# Patient Record
Sex: MALE | Race: ASIAN
[De-identification: names, ages, dates, MRNs, and addresses within clinical notes are randomized per-mention and may not be internally consistent; named-entity substitution may affect disease eponyms.]

---

## 2017-04-16 NOTE — ERD
ER Documentation


Chief Complaint


Date/Time


DATE: 4/16/17 


TIME: 23:37


Chief Complaint


right shoulder pain believes it's gout





HPI


67-year-old male presents to emergency department for complaints of right 

shoulder pain started today. Patient drank some alcohol 4 hours prior to the 

pain, started to have the pain afterwards. Patient denies any trauma on 

affected area. Patient has history of gout, and is thinking that it may be gout 

flareup. Patient did not take any medications for pain. Patient denies any 

numbness or tingling. Patient denies any deformity. Patient has had the same 

type of pain before and it has been his gout flareup. Patient denies any 

redness or swelling. Patient denies any deformity.





ROS


All systems reviewed and are negative except as per history of present illness.





Medications


Home Meds


Active Scripts


Hydrocodone/Acetaminophen (Norco 5-325 Tablet) 1 Each Tablet, 1 TAB PO Q6H Y 

for SEVERE PAIN LEVEL 7-10, #20 TAB


   Prov:KARIN MEHTA NP         4/17/17


Ibuprofen* (Motrin*) 600 Mg Tab, 600 MG PO Q6H Y for PAIN AND OR ELEVATED TEMP, 

#30 TAB


   Prov:KARIN MEHTA NP         4/17/17


Reported Medications


[cardiac meds] Unknown Strength  No Conflict Check


   4/16/17


[htn meds] Unknown Strength  No Conflict Check


   4/16/17


[dm meds] Unknown Strength  No Conflict Check


   4/16/17





Allergies


Allergies:  


Coded Allergies:  


     No Known Allergy (Unverified , 4/16/17)





PMhx/Soc


Hx Miscellaneous Medical Probl:  Yes (arthritis)


Hx Alcohol Use:  No


Hx Substance Use:  No


Hx Tobacco Use:  No


Smoking Status:  Never smoker





FmHx


Family History:  No coronary disease, No diabetes, No other





Physical Exam


Vitals





Vital Signs








  Date Time  Temp Pulse Resp B/P Pulse Ox O2 Delivery O2 Flow Rate FiO2


 


4/16/17 22:09 97.6 83 20 135/85 99   








Physical Exam


GENERAL:  The patient is well developed and appropriate for usual state of 

health, in no apparent distress.


CHEST:  Clear to auscultation bilaterally. There are no rales, wheezes or 

rhonchi. 


HEART:  Regular rate and rhythm. No murmurs, clicks, rubs or gallops. No S3 or 

S4.


ABDOMEN:  Soft, nontender and nondistended. Good bowel sounds. No rebound or 

guarding. No gross peritonitis. No gross organomegaly or masses. No Robbins sign 

or McBurney point tenderness.


BACK:  No midline or flank tenderness.


EXTREMITIES:  Able To do full range of motion of the right shoulder without any 

restriction, mild tenderness on palpation on the anterior aspect of the right 

shoulder, no deformity noted, no crepitus noted. Equal pulses bilaterally.  

full range of motion of other joints of the body. Grossly neurovascularly 

intact.


NEURO:  Alert and oriented. Cranial nerves 2-12 intact. Motor strength in all 4 

extremities with 5/5 strength.  Sensation grossly intact. Normal speech and 

gait. 


SKIN:  There is no apparent rash or petechia. The skin is warm and dry.


HEMATOLOGIC AND LYMPHATIC:  There is no evidence of excessive bruising or 

lymphedema. No gross cervical, axillary, or inguinal lymphadenopathy.


Results 24 hrs





 Current Medications








 Medications


  (Trade)  Dose


 Ordered  Sig/Gillian


 Route


 PRN Reason  Start Time


 Stop Time Status Last Admin


Dose Admin


 


 Ketorolac


 Tromethamine


  (Toradol)  60 mg  ONCE  STAT


 IM


   4/17/17 00:29


 4/17/17 00:33 DC  


 





Patient was given medication for pain here in emergency department, after 

treatment, patient verbalized feeling much better. Patient's pain is improved.





PROCEDURE:    Right shoulder. 


 


CLINICAL INDICATION:   Pain. 


 


TECHNIQUE:   Three views of the right shoulder. 


 


COMPARISON:   None. 


 


FINDINGS:


There is no fracture, dislocation or bone destruction.  There are degenerative 

osteophytes about the acromioclavicular joint.  Bone mineralization is within 

normal limits.  There is no radiopaque foreign body or abnormal calcification. 


 


IMPRESSION:


No acute fracture or dislocation.


Right AC joint arthrosis.


_____________________________________________ 


.Andrea Bush MD, MD           Date    Time 


Electronically viewed and signed by .Andrea Bush MD, MD on 04/17/2017 00:26 


 


D:  04/17/2017 00:26  T:  04/17/2017 00:26


.T/





CC: KARIN MEHTA NP





Procedures/MDM


Medical Decision Making: Patient's pain is most likely consistent with a  

contusion or a sprain, can be also from the arthritis noted in the x-ray, can 

be from his gout. There is no suspicion for neurovascular compromise. Patient 

has intact sensation and circulation of the affected extremity. There is low  

suspicion for septic arthritis. Patient does not have any fever. Radiology 

exams of the affected area does not show any fracture or dislocation.





Disposition: Home. Patient is given prescription for ibuprofen for pain, Norco 

for severe pain. Patient was advised to elevate the affected area and apply ice 

on  affected area. Patient was advised that if symptoms are worse, numbness, 

tingling, high fever, unable to move joint, worsening symptoms, to return to 

emergency department immediately. Otherwise, patient is advised to follow up 

with the primary care doctor in 5-7 days for reevaluation of symptoms.





Departure


Diagnosis:  


 Primary Impression:  


 Shoulder pain


 Laterality:  right  Chronicity:  acute  Qualified Code:  M25.511 - Acute pain 

of right shoulder


Condition:  Stable


Patient Instructions:  Shoulder Pain (Uncertain Cause)





Additional Instructions:  


 Patient is given prescription for ibuprofen for pain, Norco for severe pain. 

Patient was advised to elevate the affected area and apply ice on  affected 

area. Patient was advised that if symptoms are worse, numbness, tingling, high 

fever, unable to move joint, worsening symptoms, to return to emergency 

department immediately. Otherwise, patient is advised to follow up with the 

primary care doctor in 5-7 days for reevaluation of symptoms.











KARIN MEHTA NP Apr 16, 2017 23:41

## 2017-04-17 NOTE — RADRPT
PROCEDURE:    Right shoulder. 

 

CLINICAL INDICATION:   Pain. 

 

TECHNIQUE:   Three views of the right shoulder. 

 

COMPARISON:   None. 

 

FINDINGS:

There is no fracture, dislocation or bone destruction.  There are degenerative osteophytes about the
 acromioclavicular joint.  Bone mineralization is within normal limits.  There is no radiopaque fore
ign body or abnormal calcification. 

 

IMPRESSION:

No acute fracture or dislocation.

Right AC joint arthrosis.

_____________________________________________ 

.Andrea Bush MD, MD           Date    Time 

Electronically viewed and signed by .Andrea Bush MD, MD on 04/17/2017 00:26 

 

D:  04/17/2017 00:26  T:  04/17/2017 00:26

.T/

## 2017-04-26 NOTE — ERD
ER Documentation


Chief Complaint


Date/Time


DATE: 4/26/17 


TIME: 21:06


Chief Complaint


right shoulder pain , denies injury, hx- arthritis





HPI


This pleasant 67-year-old male patient presents to emergency department today 

with complaint of right shoulder pain.  Patient has history of arthritis 

reports he has been seen here in this hospital, received a Toradol injection 

which helped with his pain.  Patient states that unfortunately the day he was 

here the weight was extremely long and he left after waiting for 6 hours 

without being seen.  Patient has been seen by his primary care physician, has 

an orthopedic referral next week.  Patient reports current pain is 9/10 out of 

pain scale.  He is unable to abduct or adduct at this time he is unable to move 

his arm forward, patient states once pain is controlled he is able to move his 

arm freely.  She denies history of frozen shoulder, denies numbness or tingling 

to his fingers, denies any injury.  Patient denies chest pain, shortness of 

breath, palpitations, or dizziness.





Chart review


PROCEDURE:    Right shoulder. 


 


CLINICAL INDICATION:   Pain. 


 


TECHNIQUE:   Three views of the right shoulder. 


 


COMPARISON:   None. 


 


FINDINGS:


There is no fracture, dislocation or bone destruction.  There are degenerative 

osteophytes about the acromioclavicular joint.  Bone mineralization is within 

normal limits.  There is no radiopaque foreign body or abnormal calcification. 


 


IMPRESSION:


No acute fracture or dislocation.


Right AC joint arthrosis.


_____________________________________________ 


.Andrea Bush MD, MD           Date    Time 


Electronically viewed and signed by .Andrea Bush MD, MD on 04/17/2017 00:26 


 


D:  04/17/2017 00:26  T:  04/17/2017 00:26





ROS


All systems reviewed and are negative except as per history of present illness.





Medications


Home Meds


Active Scripts


Hydrocodone/Acetaminophen (Norco 5-325 Tablet) 1 Each Tablet, 1 TAB PO Q6H Y 

for SEVERE PAIN LEVEL 7-10, #20 TAB


   Prov:KARIN MEHTA NP         4/17/17


Ibuprofen* (Motrin*) 600 Mg Tab, 600 MG PO Q6H Y for PAIN AND OR ELEVATED TEMP, 

#30 TAB


   Prov:KARIN MEHTA NP         4/17/17


Reported Medications


[cardiac meds] Unknown Strength  No Conflict Check


   4/16/17


[htn meds] Unknown Strength  No Conflict Check


   4/16/17


[dm meds] Unknown Strength  No Conflict Check


   4/16/17





Allergies


Allergies:  


Coded Allergies:  


     No Known Allergy (Unverified , 4/16/17)





PMhx/Soc


Hx Miscellaneous Medical Probl:  Yes (arthritis)


Hx Alcohol Use:  No


Hx Substance Use:  No


Hx Tobacco Use:  No


Smoking Status:  Never smoker





Physical Exam


Vitals





Vital Signs








  Date Time  Temp Pulse Resp B/P Pulse Ox O2 Delivery O2 Flow Rate FiO2


 


4/26/17 19:36 97.4 87 20 176/94 100   





Vital stable, triage note reviewed


Physical Exam


Const:     Obviously uncomfortable, no acute distress


Head:   Atraumatic 


Eyes:    Normal Conjunctiva


ENT:    Normal External Ears, Nose and Mouth.


Neck:               


Resp:   


Cardio:   


Abd:    


Skin:   


Back:    


Ext:    Upper Extremity -right arm


 Skin:                      No laceration, or evidence of external trauma


 Compartments:      Soft


 Motor:                      Passive range of motion minimal, and able to 

abduct or abduct.  Unable to raise arm forward.


 Sensation:            Intact shoulder/pinky/middle finger/thumb web space


 Bones:                  Point tender humerus


 Snuffbox:               Nontender


 Joints:                        No effusion


 Pulses/Perfusion:     2+ radial, Capillary refill < 2 seconds





Neur:    Awake and alert


Psych:    Normal Mood and Affect


Results 24 hrs





 Current Medications








 Medications


  (Trade)  Dose


 Ordered  Sig/Gillian


 Route


 PRN Reason  Start Time


 Stop Time Status Last Admin


Dose Admin


 


 Ketorolac


 Tromethamine


  (Toradol)  15 mg  ONCE  STAT


 IM


   4/26/17 20:20


 4/26/17 20:22 DC 4/26/17 20:50


 


 


 Acetaminophen


  (Tylenol Tab)  650 mg  ONCE  ONCE


 PO


   4/26/17 20:30


 4/26/17 20:31 DC 4/26/17 20:50


 











Procedures/MDM


This pleasant 67-year-old male patient presents to the emergency department 

today for evaluation of right shoulder pain.  History of arthritis.  Patient 

has been seen previously here in emergency room left without fully being seen 

had x-rays taken at that time with findings: There is no fracture, dislocation 

or bone destruction.  There are degenerative osteophytes about the 

acromioclavicular joint.  Bone mineralization is within normal limits.  There 

is no radiopaque foreign body or abnormal calcification.  Patient has 

appointment with orthopedic specialist in approximately 7 days patient is here 

today for pain control.  He has no associated numbness or tingling.  He has no 

cardiovascular complaints or irregularity.  Patient is taking diclofenace with 

no relief of symptoms.  Patient is treated with a Toradol injection and Tylenol 

in emergency department with pain decreasing to 7/10 on pain scale.  I feel 

patient can be discharged home with Ultram and a short dose of prednisone.  I 

feel the patient is stable for discharge at this time.  I have discussed results

, examination findings, the treatment plan with the patient and family present 

prior to discharge.  Indications for emergent reevaluation, side effects of 

medication were also discussed.  All questions were answered.  Patient 

verbalizes understanding and agrees with plan of care.





Departure


Diagnosis:  


 Primary Impression:  


 Degenerative joint disease, shoulder, right


 Osteoarthritis type:  unspecified  Qualified Code:  M19.011 - Osteoarthritis 

of right shoulder, unspecified osteoarthritis type


Condition:  Good


Patient Instructions:  Osteoarthritis: Managing Pain





Additional Instructions:  


Thank you for for coming to [facility] for your care today. 

Please ask your nurse or provider if you have questions about your care today 

and do not leave until all your questions have been answered.  Please use any 

medications given as directed and follow-up with your doctor (or the doctor you 

were referred to) in the next 2-3 days. If you do not have a primary care 

doctor you may follow up at the [facility] (listed below). You may also 

use motrin and tylenol as needed for fever and/or pain unless instructed 

otherwise by your provider or nurse. Indications for more urgent follow-up have 

been discussed, but you may return to the Emergency Department at ANY time for 

any worrisome or worsening symptoms.





If you have abdominal pain, please know that no test or exam you received is 

perfect and you should follow up within 8 hours for continued pain.





If you had any imaging studies today, such as an X-Ray or CT Scan, these 

studies will be reviewed later by a radiologist. You will be called if there 

are important findings that were not identified today, so make sure the contact 

information you provided at registration is correct.





If you received any narcotic pain control medicine today, such as Vicodin, 

Morphine or Dilaudid, your coordination and judgment may be affected for a 

number of hours. Please do not drive or operate heavy machinery, and you may 

want someone to assist you at home. If you were given a prescription for 

narcotic medication, be aware that it is very addictive- use sparingly and only 

if necessary.











HAWK RAE Apr 26, 2017 21:11

## 2017-06-22 NOTE — RADRPT
PROCEDURE:   XR Knee. 

 

CLINICAL INDICATION:   Knee pain 

 

TECHNIQUE:   Three views of the left knee are available for review. 

 

COMPARISON:   None available 

 

FINDINGS:

 

The medial and lateral femorotibial compartments are preserved.  Mild marginal productive change is 
noted at the patella. There is a faint cortical density seen at the lateral margin of the tibial luis antonio
teau which could be related to avulsion fracture, although age indeterminate.  Large joint effusion.
 Atherosclerotic vascular calcifications are present.

 

IMPRESSION:

 

 

1.  No acute osseous abnormality.

2.  Faint cortical fragment at the lateral margin of the tibial plateau, which could represent a cap
sular avulsion injury although age indeterminate.  Consider MRI for better evaluation of the soft ti
ssues if there is concern for ligament injury.

3.  Large joint effusion.

 

RPTAT: EE

_____________________________________________ 

.Frank Benites MD, MD           Date    Time 

Electronically viewed and signed by .Frank Benites MD, MD on 06/22/2017 12:34 

 

D:  06/22/2017 12:34  T:  06/22/2017 12:34

.d/

## 2017-06-22 NOTE — ERD
ER Documentation


Chief Complaint


Date/Time


DATE: 6/22/17 


TIME: 15:46


Chief Complaint


LEFT KNEE PAIN, HEAVY OBJECT FELL ON IT 2 DAYS AGO





HPI


67-year-old male patient with a past medical history of diabetes presents to 

the ED complaining of a left knee injury that occurred 2 days ago.  Reports 

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 

accidentally fell onto his left knee  Reports that there is bruising but denies 

any lacerations or abrasions.  Denies any fever, weakness, numbness or tingling

, loss of sensation, loss of range of motion, nausea, vomiting.





ROS


All systems reviewed and are negative except as per history of present illness.





Medications


Home Meds


Active Scripts


Ibuprofen* (Motrin*) 400 Mg Tab, 400 MG PO Q6, #30 TAB


   Prov:KENDRA HAMPTON PA-C         6/22/17


Hydrocodone/Acetaminophen (Norco 5-325 Tablet) 1 Each Tablet, 1 TAB PO Q6H Y 

for PAIN, #14 TAB


   Prov:KENDRA HAMPTON PA-C         6/22/17


Tramadol HCl (Tramadol HCl) 50 Mg Tablet, 50 MG PO Q6, #20 TAB


   Prov:BUTCH,HAWK         4/26/17


Prednisone* (Prednisone*) 20 Mg Tab, 40 MG PO DAILY for 4 Days, TAB


   Prov:BUTCH,HAWK         4/26/17


Hydrocodone/Acetaminophen (Norco 5-325 Tablet) 1 Each Tablet, 1 TAB PO Q6H Y 

for SEVERE PAIN LEVEL 7-10, #20 TAB


   Prov:KARIN MEHTA NP         4/17/17


Ibuprofen* (Motrin*) 600 Mg Tab, 600 MG PO Q6H Y for PAIN AND OR ELEVATED TEMP, 

#30 TAB


   Prov:KARIN MEHTA NP         4/17/17


Reported Medications


[cardiac meds] Unknown Strength  No Conflict Check


   4/16/17


[htn meds] Unknown Strength  No Conflict Check


   4/16/17


[dm meds] Unknown Strength  No Conflict Check


   4/16/17





Allergies


Allergies:  


Coded Allergies:  


     No Known Allergy (Unverified , 4/16/17)





PMhx/Soc


Medical and Surgical Hx:  pt denies Medical Hx, pt denies Surgical Hx


Hx Miscellaneous Medical Probl:  Yes (arthritis)


Hx Alcohol Use:  No


Hx Substance Use:  No


Hx Tobacco Use:  No


Smoking Status:  Never smoker





Physical Exam


Vitals





Vital Signs








  Date Time  Temp Pulse Resp B/P Pulse Ox O2 Delivery O2 Flow Rate FiO2


 


6/22/17 10:32 98.1 86 20 126/71 99   








Physical Exam


Const: Non-ill-appearing, well-nourished. In no acute distress.


Head: Atraumatic, normocephalic 


Eyes: Normal Conjunctiva without injection 


ENT: Normal external ear, nose and mouth. 


Neck: Full range of motion. No meningismus. 


Resp: Clear to auscultation bilaterally. No wheezing, rhonchi, rales, or 

crackles. No accessory muscle use. No retractions.


Cardio: Regular rate and rhythm, no murmurs


Skin: No petechiae or rashes


Back: No midline tenderness. No CVA tenderness.


Ext: No cyanosis, or edema. Cap refill less than 2 seconds. Distal pulses 

intact bilaterally.  Tenderness to palpation of the left medial and lateral 

aspect of patient's left knee.  Ecchymosis noted over the distal femur.  No 

erythema noted.  Slight edema noted over the superior knee.  Patient was able 

to flex, extend bilateral knees and all other extremities have full range of 

motion.


Neur: Awake and alert. Normal gait and coordination. Muscle strength 5/5. 

Sensation intact bilaterally.


Psych: Normal Mood and Affect


Results 24 hrs





 Current Medications








 Medications


  (Trade)  Dose


 Ordered  Sig/Gillian


 Route


 PRN Reason  Start Time


 Stop Time Status Last Admin


Dose Admin


 


 Acetaminophen/


 Hydrocodone Bitart


  (Norco (5/325))  1 tab  ONCE  ONCE


 PO


   6/22/17 11:00


 6/22/17 11:01 DC 6/22/17 10:54


 











Procedures/MDM


This is a 67-year-old male patient with a past medical history of diabetes 

presents to the ED with a left knee injury.  Patient is afebrile and nontoxic-

appearing.  Patient has normal vital signs.  A left knee x-ray, left femur x-

ray was ordered to further evaluate patient.





PROCEDURE:   XR Femur. 


 


CLINICAL INDICATION:   Knee/distal femoral injury 


 


TECHNIQUE:   AP and lateral views of the left femur were obtained. 


 


COMPARISON:   No prior studies are available for comparison. 


 


FINDINGS:


 


There is no acute osseous abnormality or evidence of fracture.  Mild nonuniform 

narrowing is noted about the left hip.  Mild symphysis pubis degeneration is 

seen.  Marginal productive changes seen at the partially assessed SI joints. 

Atherosclerotic vascular calcifications are present.


 


IMPRESSION:


 


1.  No acute osseous abnormality.


2.  Mild left hip arthrosis.





PROCEDURE:   XR Knee. 


 


CLINICAL INDICATION:   Knee pain 


 


TECHNIQUE:   Three views of the left knee are available for review. 


 


COMPARISON:   None available 


 


FINDINGS:


 


The medial and lateral femorotibial compartments are preserved.  Mild marginal 

productive change is noted at the patella. There is a faint cortical density 

seen at the lateral margin of the tibial plateau which could be related to 

avulsion fracture, although age indeterminate.  Large joint effusion. 

Atherosclerotic vascular calcifications are present.


 


IMPRESSION:


 


 


1.  No acute osseous abnormality.


2.  Faint cortical fragment at the lateral margin of the tibial plateau, which 

could represent a capsular avulsion injury although age indeterminate.  

Consider MRI for better evaluation of the soft tissues if there is concern for 

ligament injury.


3.  Large joint effusion.





Patient is placed in a left knee immobilizer.  Crutches were given to help with 

ambulation.


Splint Assessment: Neurovascularly intact pre and post splint placement with 

good fit.





Patient could likely have a faint cortical fragment at the lateral margin of 

the tibial plateau, which could represent a capsular avulsion injury although 

age indeterminate.  Patient's extremity symptoms have stabilized while they 

have been evaluated in the department and are appropriate for outpatient follow 

up. No evidence of dislocations, compartment syndrome, neurologic injury, 

vascular injury, open joint, open fracture, tendon laceration, septic arthritis

, osteomyelitis, DVT, foreign body, or other emergent conditions.  This case 

was discussed with with my supervising physician, Dr. Vasquez who agreed with 

the management and discharge plan.





Discharge medications: Ibuprofen, Norco


Follow up with primary care physician in 1-2 days for a referral to orthopedic 

physician. Instructed patient to return to the ED sooner for any worsening 

symptoms. Patient's questions were answered. Patient understood and agreed with 

discharge plan. Patient discharged stable.





Departure


Diagnosis:  


 Primary Impression:  


 Knee injury


 Encounter type:  initial encounter  Laterality:  left  Qualified Code:  

S89.92XA - Knee injury, left, initial encounter


Condition:  Stable


Patient Instructions:  Reducing Knee Pain and Swelling, Fracture, Knee


Referrals:  


ALEXANDRA NDIAYE MD (PCP)








[facility]


YOU HAVE RECEIVED A MEDICAL SCREENING EXAM AND THE RESULTS INDICATE THAT YOU DO 

NOT HAVE A CONDITION THAT REQUIRES URGENT TREATMENT IN THE EMERGENCY DEPARTMENT.





FURTHER EVALUATION AND TREATMENT OF YOUR CONDITION CAN WAIT UNTIL YOU ARE SEEN 

IN YOUR DOCTORS OFFICE WITHIN THE NEXT 1-2 DAYS. IT IS YOUR RESPONSIBILITY TO 

MAKE AN APPOINTMENT FOR FOLOW-UP CARE.





IF YOU HAVE A PRIMARY DOCTOR


--you should call your primary doctor and schedule an appointment





IF YOU DO NOT HAVE A PRIMARY DOCTOR YOU CAN CALL OUR PHYSICIAN REFERRAL HOTLINE 

AT


 [phone] 





IF YOU CAN NOT AFFORD TO SEE A PHYSICIAN YOU CAN CHOSE FROM THE FOLLOWING 

[facility] [phone] [address] [facility]





[facility] [phone] [address] [facility]. [facility] [phone] [address] [facility] [phone] [address] [facility] [phone] [address] [facility]. [facility] [phone] [address] [facility]. [facility]


YOU HAVE RECEIVED A MEDICAL SCREENING EXAM AND THE RESULTS INDICATE THAT YOU DO 

NOT HAVE A CONDITION THAT REQUIRES URGENT TREATMENT IN THE EMERGENCY DEPARTMENT.





FURTHER EVALUATION AND TREATMENT OF YOUR CONDITION CAN WAIT UNTIL YOU ARE SEEN 

IN YOUR DOCTORS OFFICE WITHIN THE NEXT 1-2 DAYS. IT IS YOUR RESPONSIBILITY TO 

MAKE AN APPOINTMENT FOR FOLOW-UP CARE.





IF YOU HAVE A PRIMARY DOCTOR


--you should call your primary doctor and schedule and appointment





IF YOU DO NOT HAVE A PRIMARY DOCTOR YOU CAN CALL OUR PHYSICIAN REFERRAL HOTLINE 

AT [phone].





IF YOU CAN NOT AFFORD TO SEE A PHYSICIAN YOU CAN CHOSE FROM THE FOLLOWING 

[facility] INSTITUTIONS:





[facility]


[address] [place] [address]





[facility]


[address] [place] [address]





[facility]


[address] [place] [address]





[facility] URGENT CARE/SPECIALTIES








[facility]


Urgent Care





7 a.m.- 11 p.m.


Every Day of the Week





NO APPOINTMENT OR AUTHORIZATION NEEDED


[phone]





[facility]


Hours: Mon-Fri


9:00 AM - 5:00 PM





Additional Instructions:  


FOLLOW UP WITH YOUR PRIMARY CARE PHYSICIAN TOMORROW for a referral to 

orthopedic physician for further evaluation, treatment, and MRI.Return to this 

facility if you are not improving as expected.











KENDRA HAMPTON PA-C Jun 22, 2017 15:51

## 2017-06-22 NOTE — RADRPT
PROCEDURE:   XR Femur. 

 

CLINICAL INDICATION:   Knee/distal femoral injury 

 

TECHNIQUE:   AP and lateral views of the left femur were obtained. 

 

COMPARISON:   No prior studies are available for comparison. 

 

FINDINGS:

 

There is no acute osseous abnormality or evidence of fracture.  Mild nonuniform narrowing is noted a
bout the left hip.  Mild symphysis pubis degeneration is seen.  Marginal productive changes seen at 
the partially assessed SI joints. Atherosclerotic vascular calcifications are present.

 

IMPRESSION:

 

1.  No acute osseous abnormality.

2.  Mild left hip arthrosis.

 

RPTAT: EE

_____________________________________________ 

.Frank Benites MD, MD           Date    Time 

Electronically viewed and signed by .Frank Benites MD, MD on 06/22/2017 12:34 

 

D:  06/22/2017 12:34  T:  06/22/2017 12:34

.d/

## 2019-03-08 ENCOUNTER — HOSPITAL ENCOUNTER (EMERGENCY)
Dept: HOSPITAL 10 - FTE | Age: 69
Discharge: HOME | End: 2019-03-08
Payer: COMMERCIAL

## 2019-03-08 ENCOUNTER — HOSPITAL ENCOUNTER (EMERGENCY)
Dept: HOSPITAL 91 - FTE | Age: 69
Discharge: HOME | End: 2019-03-08
Payer: COMMERCIAL

## 2019-03-08 VITALS — BODY MASS INDEX: 39.39 KG/M2 | HEIGHT: 60 IN | WEIGHT: 200.62 LBS

## 2019-03-08 VITALS — RESPIRATION RATE: 18 BRPM | SYSTOLIC BLOOD PRESSURE: 154 MMHG | HEART RATE: 72 BPM | DIASTOLIC BLOOD PRESSURE: 86 MMHG
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
## 2019-03-08 DIAGNOSIS — F17.210: ICD-10-CM

## 2019-03-08 DIAGNOSIS — S41.011A: Primary | ICD-10-CM

## 2019-03-08 DIAGNOSIS — Z23: ICD-10-CM

## 2019-03-08 DIAGNOSIS — I10: ICD-10-CM
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
## 2019-03-08 PROCEDURE — 96372 THER/PROPH/DIAG INJ SC/IM: CPT

## 2019-03-08 PROCEDURE — 73060 X-RAY EXAM OF HUMERUS: CPT

## 2019-03-08 PROCEDURE — 90471 IMMUNIZATION ADMIN: CPT

## 2019-03-08 PROCEDURE — 73030 X-RAY EXAM OF SHOULDER: CPT

## 2019-03-08 PROCEDURE — 12002 RPR S/N/AX/GEN/TRNK2.6-7.5CM: CPT

## 2019-03-08 PROCEDURE — 90715 TDAP VACCINE 7 YRS/> IM: CPT

## 2019-03-08 PROCEDURE — 99284 EMERGENCY DEPT VISIT MOD MDM: CPT

## 2019-03-08 RX ADMIN — CLOSTRIDIUM TETANI TOXOID ANTIGEN (FORMALDEHYDE INACTIVATED), CORYNEBACTERIUM DIPHTHERIAE TOXOID ANTIGEN (FORMALDEHYDE INACTIVATED), BORDETELLA PERTUSSIS TOXOID ANTIGEN (GLUTARALDEHYDE INACTIVATED), BORDETELLA PERTUSSIS FILAMENTOUS HEMAGGLUTININ ANTIGEN (FORMALDEHYDE INACTIVATED), BORDETELLA PERTUSSIS PERTACTIN ANTIGEN, AND BORDETELLA PERTUSSIS FIMBRIAE 2/3 ANTIGEN 1 ML: 5; 2; 2.5; 5; 3; 5 INJECTION, SUSPENSION INTRAMUSCULAR at 21:29

## 2019-03-08 RX ADMIN — ONDANSETRON 1 MG: 4 TABLET, ORALLY DISINTEGRATING ORAL at 19:39

## 2019-03-08 RX ADMIN — LIDOCAINE HYDROCHLORIDE,EPINEPHRINE BITARTRATE 1 ML: 20; .01 INJECTION, SOLUTION INFILTRATION; PERINEURAL at 20:41

## 2019-03-08 NOTE — ERD
ER Documentation


Chief Complaint


Chief Complaint





LAC TO RIGHT SHOULDER





HPI


Patient presents with complaint of right upper arm pain and laceration s/p 


seeing machine that "cuts [employer]" that grabbed the sleeve of his right arm 


pulling his arm into the machine.  1630 today. 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





ROS


All systems reviewed and are negative except as per history of present illness.





Medications


Home Meds


Active Scripts


Hydrocodone/Acetaminophen (Norco 5-325 Tablet) 1 Each Tablet, 1 TAB PO Q6H PRN 


for PAIN for 5 Days, #15 TAB


   Prov:RONDA SAMUEL NP         3/8/19


Cephalexin* (Cephalexin*) 500 Mg Capsule, 500 MG PO BID for 5 Days, #10 CAP


   Prov:RONDA SAMUEL NP         3/8/19


Ibuprofen* (Motrin*) 400 Mg Tab, 400 MG PO Q6, #30 TAB


   Prov:KENDRA HAMPTON PA-C         6/22/17


Hydrocodone/Acetaminophen (Norco 5-325 Tablet) 1 Each Tablet, 1 TAB PO Q6H PRN 


for PAIN, #14 TAB


   Prov:KENDRA HAMPTON PA-C         6/22/17


Tramadol HCl (Tramadol HCl) 50 Mg Tablet, 50 MG PO Q6, #20 TAB


   Prov:BUTCH,HAWK         4/26/17


Prednisone* (Prednisone*) 20 Mg Tab, 40 MG PO DAILY for 4 Days, TAB


   Prov:BUTCH,HAWK         4/26/17


Hydrocodone/Acetaminophen (Norco 5-325 Tablet) 1 Each Tablet, 1 TAB PO Q6H PRN 


for SEVERE PAIN LEVEL 7-10, #20 TAB


   Prov:KARIN MEHTA NP         4/17/17


Ibuprofen* (Motrin*) 600 Mg Tab, 600 MG PO Q6H PRN for PAIN AND OR ELEVATED 


TEMP, #30 TAB


   Prov:KARIN MEHTA NP         4/17/17


Reported Medications


[cardiac meds] Unknown Strength  No Conflict Check


   4/16/17


[htn meds] Unknown Strength  No Conflict Check


   4/16/17


[dm meds] Unknown Strength  No Conflict Check


   4/16/17





Allergies


Allergies:  


Coded Allergies:  


     No Known Allergy (Unverified , 4/16/17)





PMhx/Soc


Medical and Surgical Hx:  pt denies Surgical Hx


Hx Cardiac Disorders:  Yes (HTN)


Hx Miscellaneous Medical Probl:  Yes (arthritis)


Hx Alcohol Use:  Yes


Hx Substance Use:  No


Hx Tobacco Use:  Yes


Smoking Status:  Light tobacco smoker





Physical Exam


Vitals





Vital Signs


  Date      Temp  Pulse  Resp  B/P (MAP)   Pulse Ox  O2          O2 Flow    FiO2


Time                                                 Delivery    Rate


    3/8/19  98.1     72    18      154/86        99


     16:33                          (108)





Physical Exam


Const:   No acute distress


Head:   Atraumatic 


Eyes:    Normal Conjunctiva


ENT:    Normal External Ears, Nose and Mouth.


Neck:               Full range of motion. No meningismus.


Resp:   Clear to auscultation bilaterally


Cardio:   Regular rate and rhythm, no murmurs


Abd:    Soft, non tender, non distended. Normal bowel sounds


Skin:   No petechiae or rashes


Back:   No midline or flank tenderness


Ext:    RUE: brusing to upper arm, large laceration to bicep area, sensation 


intact, pulses +2, flexion limited to 45 degrees, point tenderness at head of 


humerus


Neur:   Awake and alert


Psych:    Normal Mood and Affect


Results 24 hrs





Current Medications


 Medications
   Dose
          Sig/Gillian
       Start Time
   Status  Last


 (Trade)       Ordered        Route
 PRN     Stop Time              Admin
Dose


                              Reason                                Admin


 Ondansetron    4 mg           ONCE  STAT
    3/8/19        DC            3/8/19


HCl
  (Zofran                 ODT
           19:09
 3/8/19                19:39



Odt)                                         19:11


 Morphine       4 mg           ONCE  STAT
    3/8/19        DC            3/8/19


Sulfate
                      IM
            19:09
 3/8/19                19:40



(morphine)                                   19:11


 Lidocaine/
    20 ml          ONCE  ONCE
    3/8/19        DC       



Epinephrine
                  INJ
           20:30
 3/8/19


(Xylocaine                                   20:31


2%/
 Epi


(Mdv) 20 ml)


 Diphtheria/
   0.5 ml         ONCE ONCE
     3/8/19        DC            3/8/19


Tetanus/Acell                 IM*
           21:30
 3/8/19                21:29




 Pertussis
                                 21:31


(Adacel)


LACERATION REPAIR





Wound was copiously irrigated with normal saline.  Wound was prepared in 


standard sterile fashion.  Adequate anesthesia was obtained with lidocaine 1% 


with epi.  Wound had 2 edges that joined at a 90 degree angle with the superior 


surface 3 cm and superior surface 2 cm.  A total of 7 sutures of 4-0 Ethilon 


were used in a simple interrupted fashion.  +CSM post-procedure. Wound was 


dressed with Telfa and Kerlix dressing.  Patient was instructed on care of wound


and signs of infection and when to return for recheck and to have sutures rem


rere.  Patient verbalized understanding and tolerated procedure well.





Procedures/MDM


X-rays of shoulder arm did not show fracture, location, or presence of foreign 


body.








Patient states he has primary care physician and is reliable for close follow-up


with physician.  Verbalizes understanding of care of wound and need for close 


follow-up.  Patient patient and daughter verbalized understanding of discharge 


instructions.  Patient is being discharged for follow-up with primary care callie castillo.  The laceration did not appear to have any tendon involvement or 


neurovascular compromise.





Departure


Diagnosis:  


   Primary Impression:  


   Laceration


   Additional Impression:  


   Shoulder sprain











RONDA SAMUEL NP               Mar 8, 2019 19:16

## 2019-03-11 ENCOUNTER — HOSPITAL ENCOUNTER (EMERGENCY)
Dept: HOSPITAL 91 - FTE | Age: 69
Discharge: HOME | End: 2019-03-11
Payer: COMMERCIAL

## 2019-03-11 ENCOUNTER — HOSPITAL ENCOUNTER (EMERGENCY)
Dept: HOSPITAL 10 - FTE | Age: 69
Discharge: HOME | End: 2019-03-11
Payer: COMMERCIAL

## 2019-03-11 VITALS
WEIGHT: 151.24 LBS | WEIGHT: 151.24 LBS | BODY MASS INDEX: 24.31 KG/M2 | BODY MASS INDEX: 24.31 KG/M2 | HEIGHT: 66 IN | HEIGHT: 66 IN

## 2019-03-11 VITALS — DIASTOLIC BLOOD PRESSURE: 82 MMHG | HEART RATE: 62 BPM | SYSTOLIC BLOOD PRESSURE: 145 MMHG | RESPIRATION RATE: 15 BRPM

## 2019-03-11 DIAGNOSIS — Z48.01: Primary | ICD-10-CM

## 2019-03-11 DIAGNOSIS — I10: ICD-10-CM

## 2019-03-11 DIAGNOSIS — F17.210: ICD-10-CM

## 2019-03-11 PROCEDURE — 99283 EMERGENCY DEPT VISIT LOW MDM: CPT

## 2019-03-11 NOTE — ERD
ER Documentation


Chief Complaint


Chief Complaint





wound check to left arm had 7 stitches placed friday.





HPI


69-year-old male presents for wound check status post right upper extremity 


laceration repair. He was here about 3 days ago.  Denies any fevers or chills.  


He is on antibiotics.  States that his pain is controlled.





ROS


All systems reviewed and are negative except as per history of present illness.





Medications


Home Meds


Active Scripts


Mupirocin* (Bactroban*) 2% -22 Gram Oint...g., 1 APPLIC TOP BID for 7 Days, #1 


EA


   Prov:KAREENLEDA          3/11/19


Hydrocodone/Acetaminophen (Norco 5-325 Tablet) 1 Each Tablet, 1 TAB PO Q6H PRN 


for PAIN for 5 Days, #15 TAB


   Prov:RONDA SAMUEL NP         3/8/19


Cephalexin* (Cephalexin*) 500 Mg Capsule, 500 MG PO BID for 5 Days, #10 CAP


   Prov:RONDA SAMUEL NP         3/8/19


Ibuprofen* (Motrin*) 400 Mg Tab, 400 MG PO Q6, #30 TAB


   Prov:KENDRA HAMPTON PA-C         6/22/17


Hydrocodone/Acetaminophen (Norco 5-325 Tablet) 1 Each Tablet, 1 TAB PO Q6H PRN 


for PAIN, #14 TAB


   Prov:KENDRA HAMPTON PA-C         6/22/17


Tramadol HCl (Tramadol HCl) 50 Mg Tablet, 50 MG PO Q6, #20 TAB


   Prov:BUTCH,HAWK         4/26/17


Prednisone* (Prednisone*) 20 Mg Tab, 40 MG PO DAILY for 4 Days, TAB


   Prov:BUTCH,HAWK         4/26/17


Hydrocodone/Acetaminophen (Norco 5-325 Tablet) 1 Each Tablet, 1 TAB PO Q6H PRN 


for SEVERE PAIN LEVEL 7-10, #20 TAB


   Prov:KARIN MEHTA NP         4/17/17


Ibuprofen* (Motrin*) 600 Mg Tab, 600 MG PO Q6H PRN for PAIN AND OR ELEVATED 


TEMP, #30 TAB


   Prov:KARIN MEHTA NP         4/17/17


Reported Medications


[cardiac meds] Unknown Strength  No Conflict Check


   4/16/17


[htn meds] Unknown Strength  No Conflict Check


   4/16/17


[dm meds] Unknown Strength  No Conflict Check


   4/16/17





Allergies


Allergies:  


Coded Allergies:  


     No Known Allergy (Unverified , 3/11/19)





PMhx/Soc


Medical and Surgical Hx:  pt denies Surgical Hx


Hx Cardiac Disorders:  Yes (HTN)


Hx Miscellaneous Medical Probl:  Yes (arthritis)


Hx Alcohol Use:  Yes


Hx Substance Use:  No


Hx Tobacco Use:  Yes


Smoking Status:  Current every day smoker





Physical Exam


Vitals





Vital Signs


  Date      Temp  Pulse  Resp  B/P (MAP)   Pulse Ox  O2          O2 Flow    FiO2


Time                                                 Delivery    Rate


   3/11/19  97.1     69    16      142/73        98


     19:13                           (96)





Physical Exam


Const:   No acute distress


Resp:   Clear to auscultation bilaterally


Cardio:   Regular rate and rhythm, no murmurs


Skin:   No petechiae or rashes


Ext:    Right upper arm laceration repair site clean dry and intact, no signs of


infection


Neur:   Awake and alert, bilateral upper extremity sensation intact


Psych:    Normal Mood and Affect





Procedures/MDM


Medical Decision Making:





Patient status post laceration repair of the right upper arm.


The parasite clean dry and intact.  No signs of infection.  Patient is neurov


ascularly intact





Patient appeared well on physical exam.





Advised to return to the ER in 5 to 7 days for suture removal


Advised to continue with antibiotics.





Patient given prescription for Bactroban





Patient advised to follow up with PCP in 1-2 days. Patient advised to return to 


ED for new or worsening symptoms. Patient stable on discharge from the ED.








Disclaimer: Inadvertent spelling and grammatical errors are likely due to 


EHR/dictation software use and do not reflect on the overall quality of patient 


care. Also, please note that the electronic time recorded on this note does not 


necessarily reflect the actual time of the patient encounter.





Departure


Diagnosis:  


   Primary Impression:  


   Encounter for wound re-check


Condition:  Fair


Patient Instructions:  Wound Care


Referrals:  


[facility]


YOU HAVE RECEIVED A MEDICAL SCREENING EXAM AND THE RESULTS INDICATE THAT YOU DO 


NOT HAVE A CONDITION THAT REQUIRES URGENT TREATMENT IN THE EMERGENCY DEPARTMENT.





FURTHER EVALUATION AND TREATMENT OF YOUR CONDITION CAN WAIT UNTIL YOU ARE SEEN 


IN YOUR DOCTORS OFFICE WITHIN THE NEXT 1-2 DAYS. IT IS YOUR RESPONSIBILITY TO 


MAKE AN APPOINTMENT FOR FOLOW-UP CARE.





IF YOU HAVE A PRIMARY DOCTOR


--you should call your primary doctor and schedule an appointment





IF YOU DO NOT HAVE A PRIMARY DOCTOR YOU CAN CALL OUR PHYSICIAN REFERRAL HOTLINE 


AT


 [phone] 





IF YOU CAN NOT AFFORD TO SEE A PHYSICIAN YOU CAN CHOSE FROM THE FOLLOWING 


[facility] CL[facility]





[facility] [phone] [address] [facility] [phone] [address] [facility]. [facility] [phone] [address] [facility]. [facility] [phone] [facility]. [facility] [phone] [address] [facility]. [facility]. [phone] [address]





Additional Instructions:  


Call your primary care doctor TOMORROW for an appointment during the next 1-2 


days.See the doctor sooner or return here if your condition worsens before your 


appointment time.











LEDA MATHUR DO                 Mar 11, 2019 20:57